# Patient Record
Sex: FEMALE | Race: OTHER | Employment: UNEMPLOYED | URBAN - METROPOLITAN AREA
[De-identification: names, ages, dates, MRNs, and addresses within clinical notes are randomized per-mention and may not be internally consistent; named-entity substitution may affect disease eponyms.]

---

## 2024-06-28 ENCOUNTER — APPOINTMENT (OUTPATIENT)
Dept: GENERAL RADIOLOGY | Age: 24
End: 2024-06-28

## 2024-06-28 ENCOUNTER — HOSPITAL ENCOUNTER (EMERGENCY)
Age: 24
Discharge: HOME | End: 2024-06-29
Attending: EMERGENCY MEDICINE

## 2024-06-28 VITALS
OXYGEN SATURATION: 100 % | SYSTOLIC BLOOD PRESSURE: 123 MMHG | HEART RATE: 75 BPM | RESPIRATION RATE: 16 BRPM | DIASTOLIC BLOOD PRESSURE: 81 MMHG | TEMPERATURE: 99.1 F

## 2024-06-28 DIAGNOSIS — S62.646A NONDISPLACED FRACTURE OF PROXIMAL PHALANX OF RIGHT LITTLE FINGER, INITIAL ENCOUNTER FOR CLOSED FRACTURE: Primary | ICD-10-CM

## 2024-06-28 PROCEDURE — 73130 X-RAY EXAM OF HAND: CPT

## 2024-06-28 PROCEDURE — 99283 EMERGENCY DEPT VISIT LOW MDM: CPT

## 2024-06-28 PROCEDURE — 29125 APPL SHORT ARM SPLINT STATIC: CPT

## 2024-06-28 RX ORDER — ACETAMINOPHEN 500 MG
1000 TABLET ORAL ONCE
Status: COMPLETED | OUTPATIENT
Start: 2024-06-28 | End: 2024-06-29

## 2024-06-28 RX ORDER — IBUPROFEN 800 MG/1
800 TABLET ORAL ONCE
Status: COMPLETED | OUTPATIENT
Start: 2024-06-28 | End: 2024-06-29

## 2024-06-28 ASSESSMENT — PAIN SCALES - GENERAL: PAINLEVEL_OUTOF10: 10

## 2024-06-28 ASSESSMENT — PAIN - FUNCTIONAL ASSESSMENT: PAIN_FUNCTIONAL_ASSESSMENT: 0-10

## 2024-06-29 PROCEDURE — 6370000000 HC RX 637 (ALT 250 FOR IP): Performed by: EMERGENCY MEDICINE

## 2024-06-29 RX ORDER — ACETAMINOPHEN 500 MG
1000 TABLET ORAL EVERY 6 HOURS PRN
Qty: 42 TABLET | Refills: 0 | Status: SHIPPED | OUTPATIENT
Start: 2024-06-29 | End: 2024-07-04

## 2024-06-29 RX ORDER — IBUPROFEN 800 MG/1
800 TABLET ORAL EVERY 6 HOURS PRN
Qty: 20 TABLET | Refills: 0 | Status: SHIPPED | OUTPATIENT
Start: 2024-06-29 | End: 2024-07-04

## 2024-06-29 RX ADMIN — ACETAMINOPHEN 1000 MG: 500 TABLET ORAL at 00:00

## 2024-06-29 RX ADMIN — IBUPROFEN 800 MG: 800 TABLET, FILM COATED ORAL at 00:00

## 2024-06-29 ASSESSMENT — ENCOUNTER SYMPTOMS
SHORTNESS OF BREATH: 0
ABDOMINAL PAIN: 0

## 2024-06-29 NOTE — ED PROVIDER NOTES
[facility] ED  Emergency Department Encounter  Emergency Medicine Resident     Pt Name:Sandra Vargas  MRN: [id number]  Birthdate 2000  Date of evaluation: 6/28/24  PCP:  No primary care provider on file.  Note Started: 11:22 PM EDT      CHIEF COMPLAINT       Chief Complaint   Patient presents with    Hand Injury       HISTORY OF PRESENT ILLNESS  (Location/Symptom, Timing/Onset, Context/Setting, Quality, Duration, Modifying Factors, Severity.)      Sandra Vargas is a 23 y.o. female who presents with right hand pain.  Patient is Frisian-speaking only therefore  was used.  Patient states that she punched a wall today.  Patient stated that after punching the wall she had pain in the right third fourth and fifth MCP joint.  Patient is right-hand dominant.  Patient did not take anything for pain today.  Patient denies any chance of pregnancy.    PAST MEDICAL / SURGICAL / SOCIAL / FAMILY HISTORY      has no past medical history on file.     has no past surgical history on file.    Social History     Socioeconomic History    Marital status: Single     Spouse name: Not on file    Number of children: Not on file    Years of education: Not on file    Highest education level: Not on file   Occupational History    Not on file   Tobacco Use    Smoking status: Not on file    Smokeless tobacco: Not on file   Substance and Sexual Activity    Alcohol use: Not on file    Drug use: Not on file    Sexual activity: Not on file   Other Topics Concern    Not on file   Social History Narrative    Not on file     Social Determinants of Health     Financial Resource Strain: Not on file   Food Insecurity: Not on file   Transportation Needs: Not on file   Physical Activity: Not on file   Stress: Not on file   Social Connections: Not on file   Intimate Partner Violence: Not on file   Housing Stability: Not on file       No family history on file.    Allergies:  Patient has  PRACTICE AT [facility]  [address] [id number] [phone]  Schedule an appointment as soon as possible for a visit       [facility]  [address] [id number] [phone]  Schedule an appointment as soon as possible for a visit       [facility] ED  [address] [id number] [phone]    As needed, If symptoms worsen      DISCHARGE MEDICATIONS:  Discharge Medication List as of 6/29/2024  2:02 AM        START taking these medications    Details   acetaminophen (TYLENOL) 500 MG tablet Take 2 tablets by mouth every 6 hours as needed for Pain, Disp-42 tablet, R-0Print      ibuprofen (IBU) 800 MG tablet Take 1 tablet by mouth every 6 hours as needed for Pain, Disp-20 tablet, R-0Print             Cynthia Gibson MD  Emergency Medicine Resident    (Please note that portions of this note were completed with a voice recognition program.  Efforts were made to edit the dictations but occasionally words are mis-transcribed.)

## 2024-06-29 NOTE — DISCHARGE INSTRUCTIONS
You were seen here for right hand pain.  You were found to have a fracture of the right pinky finger.  A splint was applied.  You need to keep the splint dry, put a bag over it when showering.  Leave the splint on until you are able to see plastic surgery for follow-up appointment.  You need to call and schedule follow-up appointment with the specialty clinic, plastic surgery, for as soon as possible.  You also need to call and schedule follow-up appointment with your primary care provider for soon as possible.    You were given a prescription for Tylenol and Motrin, alternate between these medications as needed for pain.    Return to the emergency department immediately if you experience worsening symptoms, develop any other symptoms, or if you have any other concerns.

## 2024-06-29 NOTE — ED PROVIDER NOTES
Faculty Sign-Out Attestation  Handoff taken on the following patient from prior Attending Physician: Marlon  Note Started: 12:01 AM EDT    I was available and discussed any additional care issues that arose and coordinated the management plans with the resident(s) caring for the patient during my duty period. Any areas of disagreement with resident’s documentation of care or procedures are noted on the chart. I was personally present for the key portions of any/all procedures during my duty period. I have documented in the chart those procedures where I was not present during the key portions.    Hand injury, punch injury,   Xr pending,     Xr suspected fx /// splinted referred     Xavi Blackman,   06/29/24 0209

## 2024-06-29 NOTE — ED PROVIDER NOTES
[facility]     Emergency Department     Faculty Attestation    I performed a history and physical examination of the patient and discussed management with the resident. I reviewed the resident’s note and agree with the documented findings and plan of care. Any areas of disagreement are noted on the chart. I was personally present for the key portions of any procedures. I have documented in the chart those procedures where I was not present during the key portions. I have reviewed the emergency nurses triage note. I agree with the chief complaint, past medical history, past surgical history, allergies, medications, social and family history as documented unless otherwise noted below. Documentation of the HPI, Physical Exam and Medical Decision Making performed by medical students or scribes is based on my personal performance of the HPI, PE and MDM. For Physician Assistant/ Nurse Practitioner cases/documentation I have personally evaluated this patient and have completed at least one if not all key elements of the E/M (history, physical exam, and MDM). Additional findings are as noted.    Vital signs:   Vitals:    06/28/24 2332   BP: 123/81   Pulse: 75   Resp: 16   Temp: 99.1 °F (37.3 °C)   SpO2: 100%      23-year-old female here with right hand pain after punching a wall.  Most of her pain is located over the first and second metacarpals.  Plan for x-rays            Maritza Mason M.D,  Attending Emergency  Physician            Maritza Mason MD  06/28/24 7809

## 2024-06-29 NOTE — ED TRIAGE NOTES
Pt Presents to Ed with c/o right hand pain after hitting a wall. PMS intact, pt has bruising. Pt is alert, oriented, and ambulatory. Albanian speaking only.

## 2024-07-09 ENCOUNTER — OFFICE VISIT (OUTPATIENT)
Dept: BURN CARE | Age: 24
End: 2024-07-09

## 2024-07-09 VITALS
DIASTOLIC BLOOD PRESSURE: 70 MMHG | WEIGHT: 127 LBS | HEART RATE: 70 BPM | SYSTOLIC BLOOD PRESSURE: 120 MMHG | BODY MASS INDEX: 22.5 KG/M2 | HEIGHT: 63 IN

## 2024-07-09 DIAGNOSIS — S62.646A CLOSED NONDISPLACED FRACTURE OF PROXIMAL PHALANX OF RIGHT LITTLE FINGER, INITIAL ENCOUNTER: Primary | ICD-10-CM

## 2024-07-09 PROCEDURE — 99203 OFFICE O/P NEW LOW 30 MIN: CPT | Performed by: PLASTIC SURGERY

## 2024-07-09 NOTE — PROGRESS NOTES
Hand Clinic Note    S: Pt is a 23 y.o. RHD female being seen for right hand pain on 6/28/2024 at [facility] emergency department.  Patient became frustrated and punched a wall. Radiographs were obtained at that time and demonstrated a nondisplaced fracture of the proximal phalanx of the fifth digit.  Patient was placed in an ulnar gutter splint at that time and told to follow-up in the hand clinic. Patient does have some pain but is taking Tylenol. Patient is Bulgarian-speaking only.      O:   Vitals:    07/09/24 0823   BP: 120/70   Pulse: 70     Gen: NAD, cooperative    Right hand: Patient has an ulnar gutter splint in place. BCR to all exposed digits. SLIT to all digits.  Mild edema to the exposed dorsum of the right hand.    A/P: 23 y.o. female presenting today following a nondisplaced fracture of the proximal phalanx of the fifth digit.    -Patient to keep splint on at all times for the next 3 weeks  - Keep splint clean and dry  - Tylenol/ibuprofen for pain control  -Continue rest ice compression and elevation of the right hand  - F/u in 5 weeks     Lisandro Klein DO  Orthopedic Surgery Resident PGY-1  [place], [place]      Attending Physician Statement  I have discussed the case, including pertinent history and exam findings with the resident. I have seen and examined the patient and the key elements of all parts of the encounter have been performed by me.  I agree with the assessment, plan and orders as documented by the resident.  Eliseo Lopez MD on7/9/2024 on 9:03 AM